# Patient Record
Sex: MALE | Race: WHITE | Employment: OTHER | URBAN - METROPOLITAN AREA
[De-identification: names, ages, dates, MRNs, and addresses within clinical notes are randomized per-mention and may not be internally consistent; named-entity substitution may affect disease eponyms.]

---

## 2020-02-26 ENCOUNTER — HOSPITAL ENCOUNTER (EMERGENCY)
Age: 30
Discharge: HOME | End: 2020-02-26
Attending: EMERGENCY MEDICINE
Payer: OTHER GOVERNMENT

## 2020-02-26 VITALS
HEART RATE: 61 BPM | HEIGHT: 70 IN | OXYGEN SATURATION: 100 % | SYSTOLIC BLOOD PRESSURE: 109 MMHG | DIASTOLIC BLOOD PRESSURE: 74 MMHG | RESPIRATION RATE: 16 BRPM | BODY MASS INDEX: 26.48 KG/M2 | WEIGHT: 185 LBS | TEMPERATURE: 97.4 F

## 2020-02-26 DIAGNOSIS — L25.9 CONTACT DERMATITIS, UNSPECIFIED CONTACT DERMATITIS TYPE, UNSPECIFIED TRIGGER: Primary | ICD-10-CM

## 2020-02-26 PROCEDURE — 74011636637 HC RX REV CODE- 636/637: Performed by: PHYSICIAN ASSISTANT

## 2020-02-26 PROCEDURE — 74011250637 HC RX REV CODE- 250/637: Performed by: PHYSICIAN ASSISTANT

## 2020-02-26 PROCEDURE — 99283 EMERGENCY DEPT VISIT LOW MDM: CPT

## 2020-02-26 RX ORDER — PREDNISONE 20 MG/1
60 TABLET ORAL
Status: COMPLETED | OUTPATIENT
Start: 2020-02-26 | End: 2020-02-26

## 2020-02-26 RX ORDER — FAMOTIDINE 20 MG/1
20 TABLET, FILM COATED ORAL 2 TIMES DAILY
Qty: 20 TAB | Refills: 0 | Status: SHIPPED | OUTPATIENT
Start: 2020-02-26 | End: 2020-03-07

## 2020-02-26 RX ORDER — FAMOTIDINE 20 MG/1
40 TABLET, FILM COATED ORAL
Status: COMPLETED | OUTPATIENT
Start: 2020-02-26 | End: 2020-02-26

## 2020-02-26 RX ORDER — DIPHENHYDRAMINE HCL 25 MG
50 CAPSULE ORAL
Qty: 30 CAP | Refills: 0 | Status: SHIPPED | OUTPATIENT
Start: 2020-02-26

## 2020-02-26 RX ORDER — HYDROCORTISONE 0.5 %
OINTMENT (GRAM) TOPICAL 2 TIMES DAILY
Qty: 28.35 G | Refills: 0 | Status: SHIPPED | OUTPATIENT
Start: 2020-02-26 | End: 2020-03-07

## 2020-02-26 RX ORDER — PREDNISONE 50 MG/1
50 TABLET ORAL DAILY
Qty: 3 TAB | Refills: 0 | Status: SHIPPED | OUTPATIENT
Start: 2020-02-26 | End: 2020-02-29

## 2020-02-26 RX ADMIN — PREDNISONE 60 MG: 20 TABLET ORAL at 14:21

## 2020-02-26 RX ADMIN — FAMOTIDINE 40 MG: 20 TABLET ORAL at 14:21

## 2020-02-26 NOTE — LETTER
[address] [facility] EMERGENCY DEPT 
[address] 
[address] [facility] [address] [phone] Work/School Note Date: 2/26/2020 To Whom It May concern: 
 
Starla Grady was seen and treated today in the emergency room by the following provider(s): 
Attending Provider: Roxanne Chapman MD 
Physician Assistant: YUNG Casillas. Starla Grady may return to work on 2/27/2020. Sincerely, YUNG Romeo

## 2020-02-26 NOTE — ED PROVIDER NOTES
EMERGENCY DEPARTMENT HISTORY AND PHYSICAL EXAM      Date: 2/26/2020  Patient Name: Phong Canchola    History of Presenting Illness     Chief Complaint   Patient presents with    Rash       History Provided By: Patient    HPI: Phong Canchola, 30 y.o. male PMHx significant for asthma presents ambulatory to the ED with cc of rash to left hand yesterday that has now spread to rest of body this am. Pt describes pain as pruritic and burning. Pt reports taking benadryl yesterday and today without relief of sx. Denies SOB, trouble breathing, trouble swallowing, lip or tongue swelling. Pt denies previous allergic reactions. Patient denies new detergent, lotion, medication, food. Patient reports history of anxiety. 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 Denies known exposure to chemicals or irritants. There are no other complaints, changes, or physical findings at this time. PCP: Enrique, MD Aric    No current facility-administered medications on file prior to encounter. No current outpatient medications on file prior to encounter. Past History     Past Medical History:  Past Medical History:   Diagnosis Date    Asthma        Past Surgical History:  History reviewed. No pertinent surgical history. Family History:  History reviewed. No pertinent family history. Social History:  Social History     Tobacco Use    Smoking status: Never Smoker    Smokeless tobacco: Never Used   Substance Use Topics    Alcohol use: Not on file    Drug use: Not on file       Allergies:  No Known Allergies      Review of Systems   Review of Systems   Constitutional: Negative for chills and fever. HENT: Negative for facial swelling. Eyes: Negative for photophobia and visual disturbance. Respiratory: Negative for shortness of breath. Cardiovascular: Negative for chest pain. Gastrointestinal: Negative for abdominal pain, nausea and vomiting. Genitourinary: Negative for flank pain.    Skin: Positive for rash. Negative for color change, pallor and wound. Neurological: Negative for dizziness, weakness, light-headedness and headaches. All other systems reviewed and are negative. Physical Exam   Physical Exam  Vitals signs and nursing note reviewed. Constitutional:       General: He is not in acute distress. Appearance: He is well-developed. Comments: Pt well-appearing in NAD   HENT:      Head: Normocephalic and atraumatic. Mouth/Throat:      Comments: No pharyngeal, tongue or lip swelling appreciated  Pt maintaining airway   Not drooling  Eyes:      Conjunctiva/sclera: Conjunctivae normal.   Cardiovascular:      Rate and Rhythm: Normal rate and regular rhythm. Heart sounds: Normal heart sounds. Pulmonary:      Effort: Pulmonary effort is normal. No respiratory distress. Breath sounds: Normal breath sounds. Comments: Lungs CTA  Not working to breathe  No intercostal retractions  Abdominal:      General: Bowel sounds are normal.      Palpations: Abdomen is soft. Tenderness: There is no abdominal tenderness. Musculoskeletal: Normal range of motion. Skin:     General: Skin is warm. Findings: No rash. Comments: Tiny erythematous papular lesions to bilateral legs, bilateral hands, trunk and left forehead. No surrounding erythema, warmth, drainage, induration, fluctuance. Not vesicular. No sloughing. Neurological:      Mental Status: He is alert and oriented to person, place, and time. Cranial Nerves: No cranial nerve deficit. Psychiatric:         Behavior: Behavior normal.         Diagnostic Study Results     Labs -   No results found for this or any previous visit (from the past 12 hour(s)). Radiologic Studies -   No orders to display     CT Results  (Last 48 hours)    None        CXR Results  (Last 48 hours)    None          Medical Decision Making   I am the first provider for this patient.     I reviewed the vital signs, available nursing notes, past medical history, past surgical history, family history and social history. Vital Signs-Reviewed the patient's vital signs. Patient Vitals for the past 12 hrs:   Temp Pulse Resp BP SpO2   02/26/20 1425  61 16 109/74 100 %   02/26/20 1219 97.4 °F (36.3 °C) 65 16 133/89 100 %       Records Reviewed: Nursing Notes and Old Medical Records    Provider Notes (Medical Decision Making):   DDx: Allergic reaction, contact dermatitis, poison ivy, scabies    31 yo M with complaints of pruritic and burning rash to hand that began yesterday and that then spread to rest of body today. Lungs CTA. No pharyngeal, tongue or lip swelling. Mild improvement of prednisone and pepcid. Pt non-toxic appearing, afebrile, not working to breathe, SpO2; 100%. Patient stable for outpatient management. ED Course:   Initial assessment performed. The patients presenting problems have been discussed, and they are in agreement with the care plan formulated and outlined with them. I have encouraged them to ask questions as they arise throughout their visit. 1450  Pt reports mild improvement of sx with prednisone and Pepcid. Patient continues to deny S OB, trouble breathing. Upon reassessment lungs CTA, no pharyngeal swelling, no tongue or lip swelling appreciated. Disposition:  2:56 PM  Discussed dx and treatment plan. Discussed importance of PCP follow up. All questions answered. Pt voiced they understood. Return if sx worsen. PLAN:  1. Current Discharge Medication List      START taking these medications    Details   predniSONE (DELTASONE) 50 mg tablet Take 1 Tab by mouth daily for 3 days. Qty: 3 Tab, Refills: 0      diphenhydrAMINE (BENADRYL) 25 mg capsule Take 2 Caps by mouth every six (6) hours as needed for Skin Irritation. Qty: 30 Cap, Refills: 0      famotidine (PEPCID) 20 mg tablet Take 1 Tab by mouth two (2) times a day for 10 days.   Qty: 20 Tab, Refills: 0      hydrocortisone (CORTIZONE) 0.5 % ointment Apply  to affected area two (2) times a day for 10 days. Apply to affected area  Qty: 28.35 g, Refills: 0           2. Follow-up Information     Follow up With Specialties Details Why [address]  Schedule an appointment as soon as possible for a visit in 1 day  [address] [place] [address] [facility] EMERGENCY DEPT Emergency Medicine  If symptoms worsen [address]  [phone]        Return to ED if worse     Diagnosis     Clinical Impression:   1. Contact dermatitis, unspecified contact dermatitis type, unspecified trigger        Attestations:    YUNG Arellano    Please note that this dictation was completed with [email or web address], the computer voice recognition software. Quite often unanticipated grammatical, syntax, homophones, and other interpretive errors are inadvertently transcribed by the computer software. Please disregard these errors. Please excuse any errors that have escaped final proofreading. Thank you.

## 2020-02-26 NOTE — ED TRIAGE NOTES
Noted sesar on left hand yesterday. Now all over. Burns. Feels hot all over. Took benadryl w/ no relief.

## 2021-10-28 NOTE — DISCHARGE INSTRUCTIONS
Patient Education        Dermatitis: Care Instructions  Your Care Instructions  Dermatitis is the general name used for any rash or inflammation of the skin. Different kinds of dermatitis cause different kinds of rashes. Common causes of a rash include new medicines, plants (such as poison oak or poison ivy), heat, and stress. Certain illnesses can also cause a rash. An allergic reaction to something that touches your skin, such as latex, nickel, or poison ivy, is called contact dermatitis. Contact dermatitis may also be caused by something that irritates the skin, such as bleach, a chemical, or soap. These types of rashes cannot be spread from person to person. How long your rash will last depends on what caused it. Rashes may last a few days or months. Follow-up care is a key part of your treatment and safety. Be sure to make and go to all appointments, and call your doctor if you are having problems. It's also a good idea to know your test results and keep a list of the medicines you take. How can you care for yourself at home? · Do not scratch the rash. Cut your nails short, and file them smooth. Or wear gloves if this helps keep you from scratching. · Wash the area with water only. Pat dry. · Put cold, wet cloths on the rash to reduce itching. · Keep cool, and stay out of the sun. · Leave the rash open to the air as much as possible. · If the rash itches, use hydrocortisone cream. Follow the directions on the label. Calamine lotion may help for plant rashes. · Take an over-the-counter antihistamine, such as diphenhydramine (Benadryl) or loratadine (Claritin), to help calm the itching. Read and follow all instructions on the label. · If your doctor prescribed a cream, use it as directed. If your doctor prescribed medicine, take it exactly as directed. When should you call for help?   Call your doctor now or seek immediate medical care if:    · You have symptoms of infection, such as:  ? Increased pain, swelling, warmth, or redness. ? Red streaks leading from the area. ? Pus draining from the area. ? A fever.     · You have joint pain along with the rash.    Watch closely for changes in your health, and be sure to contact your doctor if:    · Your rash is changing or getting worse.     · You are not getting better as expected. Where can you learn more? Go to http://nahid-xavier.info/. Enter (13) [id number] in the search box to learn more about \"Dermatitis: Care Instructions. \"  Current as of: April 1, 2019  Content Version: 12.2  © 7821-4974 [employer], "[employer]. Care instructions adapted under license by [employer] (which disclaims liability or warranty for this information). If you have questions about a medical condition or this instruction, always ask your healthcare professional. [address] any warranty or liability for your use of this information. SB[facility] referral